# Patient Record
(demographics unavailable — no encounter records)

---

## 2025-01-14 NOTE — HISTORY OF PRESENT ILLNESS
[N] : Patient denies prior pregnancies [FreeTextEntry1] : 61 yr old P0 here for annual exam. Has lost 69 pounds on Wegovy. Feels great. [Mammogramdate] : 02/24 [TextBox_19] : [facility] [BreastSonogramDate] : 02/24 [TextBox_25] : N.W [PapSmeardate] : 01/24 [BoneDensityDate] : 12/22 [TextBox_37] : N.W [LMPDate] :

## 2025-01-14 NOTE — PHYSICAL EXAM
[Appropriately responsive] : appropriately responsive [Alert] : alert [No Acute Distress] : no acute distress [Soft] : soft [Non-tender] : non-tender [Non-distended] : non-distended [No HSM] : No HSM [No Lesions] : no lesions [No Mass] : no mass [Oriented x3] : oriented x3 [Examination Of The Breasts] : a normal appearance [No Discharge] : no discharge [Breast Nipple Inversion Right] : inverted [No Masses] : no breast masses were palpable [Labia Majora] : normal [Labia Minora] : normal [Normal] : normal [Uterine Adnexae] : normal [No Tenderness] : no tenderness [Nl Sphincter Tone] : normal sphincter tone [FreeTextEntry9] : Guaiac neg

## 2025-01-14 NOTE — PLAN
[FreeTextEntry1] : Pap Mammo Breast sono. Right nipple inversion long standing, unchanged Bone density Return 1 year or prn

## 2025-02-24 NOTE — ASSESSMENT
[FreeTextEntry1] : 61 year old female with hypothyroidism due to Hashimoto's Thyroiditis, a small thyroid nodule which is no longer visualized on imaging, vitamin D deficiency, hyperlipidemia and obesity here for follow up.  She is clinically and biochemically euthyroid on LT4.    1. Hypothyroidism- Continue current dose of LT4. 2. Obesity-  Continue Wegovy 2.4 mg qweek until BMI is in normal range.   Goal weight is around 170 pounds.    Follow up in 6 months.

## 2025-02-24 NOTE — HISTORY OF PRESENT ILLNESS
[FreeTextEntry1] : follow up of hypothyroidism and obesity  Primary hypothyroidism due to Hashimoto's thyroiditis was diagnosed in 2013 and has improved with LT4 therapy.   History of a small thyroid nodule, but this was no longer visible on imaging in 2022.    Also with longstanding Obesity, not responsive to lifestyle modification alone.      Current Regimen: Synthroid 100 mcg daily   Wegovy 2.4 mg qweek.     Continues to lose weight, but more slowly now.  Total weight loss is about 70 pounds.   Has neem working out 4 times a week and walking a lot for exercise.

## 2025-02-24 NOTE — DATA REVIEWED
[FreeTextEntry1] : Thyroid US 5/19/2022: Diffusely heterogeneous gland. No discrete nodules visualized.   Thyroid US:10/28/2019: LMP 0.4 cm hypoechoic nodule (smaller).   LABS: 11/21/2024:  TSH 0.82  8/7/2024:  TSH 1.98

## 2025-02-24 NOTE — REVIEW OF SYSTEMS
[Recent Weight Loss (___ Lbs)] : recent weight loss: [unfilled] lbs [Nausea] : no nausea [Headaches] : headaches

## 2025-05-08 NOTE — PROCEDURE
[FreeTextEntry3] : Bilateral breast ultrasound  Four-quadrant survey the left breast demonstrates no developing mass  Four-quadrant survey the right breast demonstrates no developing mass  BI-RADS 2

## 2025-05-08 NOTE — ASSESSMENT
[FreeTextEntry1] : History for dense breast tissue  TC score 21%  Options for additional screening with breast MRI were reviewed  Patient will be BRCA screening  Further recommendations will be made pending results of BRCA screening  Total 30 minutes was spent in consultation

## 2025-05-08 NOTE — HISTORY OF PRESENT ILLNESS
[FreeTextEntry1] : Referred by Dr. Long GYN. Patient had mammogram and ultrasound performed and TC score was calculated as 21% Patient denies palpable mass, breast pain, redness on the skin, nipple discharge  No family history of breast cancer No personal history of cancer No prior breast surgery or breast biopsies.